# Patient Record
Sex: MALE | Race: WHITE | Employment: PART TIME | URBAN - METROPOLITAN AREA
[De-identification: names, ages, dates, MRNs, and addresses within clinical notes are randomized per-mention and may not be internally consistent; named-entity substitution may affect disease eponyms.]

---

## 2017-04-06 ENCOUNTER — HOSPITAL ENCOUNTER (EMERGENCY)
Age: 42
Discharge: LEFT BEFORE DISCHARGE | End: 2017-04-06
Attending: EMERGENCY MEDICINE | Admitting: EMERGENCY MEDICINE
Payer: SELF-PAY

## 2017-04-06 VITALS
OXYGEN SATURATION: 93 % | BODY MASS INDEX: 24.52 KG/M2 | HEART RATE: 100 BPM | TEMPERATURE: 98.2 F | SYSTOLIC BLOOD PRESSURE: 139 MMHG | DIASTOLIC BLOOD PRESSURE: 83 MMHG | HEIGHT: 73 IN | WEIGHT: 185 LBS | RESPIRATION RATE: 20 BRPM

## 2017-04-06 DIAGNOSIS — S61.211A LACERATION OF LEFT INDEX FINGER: Primary | ICD-10-CM

## 2017-04-06 PROCEDURE — 99281 EMR DPT VST MAYX REQ PHY/QHP: CPT

## 2017-04-06 NOTE — ED NOTES
Patient complains of left first finger laceration while cutting some meat about 3 hours ago. bleeding is controlled at this time, patient walked into triage with his finger in a splint and wrapped in gauze.    Patient refuses xray

## 2017-04-06 NOTE — ED NOTES
Laceration cart has been set up at bedside and provider JAKE Manuel was preparing to go to bedside to assess wound and determine the need for repair. Pt approached nursing station in a hostile manor using vulgar language and reported that he is leaving. Attempted to talk pt into staying by informing him that provider is seeing him next but pt became more agitated and walked away towards ED lobby. Provider was informed.

## 2017-04-06 NOTE — ED PROVIDER NOTES
HPI Comments: Marcos Hardy is a  42 y.o. Normal build  [place] male presents to the ED via POV c/o left index finger lac that occurred while cutting meat 3 hours ago. Pt refuses x-rays. Last Td Unknown       Patient is a 42 y.o. male presenting with skin laceration. Laceration           History reviewed. No pertinent past medical history. History reviewed. No pertinent surgical history. History reviewed. No pertinent family history. Social History     Social History    Marital status: SINGLE     Spouse name: N/A    Number of children: N/A    Years of education: N/A     Occupational History    Not on file. Social History Main Topics    Smoking status: Never Smoker    Smokeless tobacco: Not on file    Alcohol use Yes    Drug use: No    Sexual activity: Not on file     Other Topics Concern    Not on file     Social History Narrative    No narrative on file         ALLERGIES: Review of patient's allergies indicates no known allergies. Review of Systems   Constitutional: Negative for chills and fever. Eyes: Negative. Respiratory: Negative for cough and shortness of breath. Cardiovascular: Negative for chest pain and palpitations. Genitourinary: Negative for urgency. Musculoskeletal: Negative for arthralgias and joint swelling. Skin: Positive for wound. Negative for color change, pallor and rash. Neurological: Negative for dizziness and headaches. Psychiatric/Behavioral: Negative for behavioral problems. Vitals:    04/06/17 0315   BP: 139/83   Pulse: 100   Resp: 20   Temp: 98.2 °F (36.8 °C)   SpO2: 93%   Weight: 83.9 kg (185 lb)   Height: 6' 1\" (1.854 m)            Physical Exam   Constitutional: He is oriented to person, place, and time. He appears well-developed and well-nourished. Cardiovascular: Normal rate, regular rhythm and normal heart sounds. Pulmonary/Chest: Effort normal and breath sounds normal. No respiratory distress.    Neurological: He is alert and oriented to person, place, and time. He has normal strength. He is not disoriented. No sensory deficit. Gait Normal   Skin: Skin is warm and dry. Laceration (1.5 cm lac to the LUE Index Finger) noted. No rash noted. No cyanosis. No pallor. Nursing note and vitals reviewed. MDM  Number of Diagnoses or Management Options  Laceration of left index finger: new and requires workup  Diagnosis management comments: DDX: left Finger Sprain, Strain, Fracture, Dislocation, Laceration (Simple v. Complex), Contusion, Abrasion, Cellulitis, Paronychia, Felon, Subungual Hematoma, Tenosynovitis, Dupuytren's Contracture, Trigger Finger, Herpetic Ryann. Went to repair laceration, informed by staff patient was unwilling to wait any further. He also refused x-ray of the digit.          Procedures

## 2019-12-20 ENCOUNTER — APPOINTMENT (OUTPATIENT)
Dept: GENERAL RADIOLOGY | Age: 44
End: 2019-12-20
Attending: PHYSICIAN ASSISTANT
Payer: SELF-PAY

## 2019-12-20 ENCOUNTER — HOSPITAL ENCOUNTER (EMERGENCY)
Age: 44
Discharge: HOME | End: 2019-12-20
Attending: EMERGENCY MEDICINE
Payer: SELF-PAY

## 2019-12-20 VITALS
DIASTOLIC BLOOD PRESSURE: 86 MMHG | HEART RATE: 99 BPM | TEMPERATURE: 97.9 F | OXYGEN SATURATION: 98 % | RESPIRATION RATE: 18 BRPM | SYSTOLIC BLOOD PRESSURE: 139 MMHG

## 2019-12-20 DIAGNOSIS — S20.211A CONTUSION OF RIB ON RIGHT SIDE, INITIAL ENCOUNTER: Primary | ICD-10-CM

## 2019-12-20 PROCEDURE — 74011250637 HC RX REV CODE- 250/637: Performed by: PHYSICIAN ASSISTANT

## 2019-12-20 PROCEDURE — 71101 X-RAY EXAM UNILAT RIBS/CHEST: CPT

## 2019-12-20 PROCEDURE — 99283 EMERGENCY DEPT VISIT LOW MDM: CPT

## 2019-12-20 RX ORDER — HYDROCODONE BITARTRATE AND ACETAMINOPHEN 7.5; 325 MG/1; MG/1
1 TABLET ORAL
Status: COMPLETED | OUTPATIENT
Start: 2019-12-20 | End: 2019-12-20

## 2019-12-20 RX ORDER — IBUPROFEN 800 MG/1
800 TABLET ORAL
Qty: 20 TAB | Refills: 0 | Status: SHIPPED | OUTPATIENT
Start: 2019-12-20 | End: 2019-12-27

## 2019-12-20 RX ORDER — HYDROCODONE BITARTRATE AND ACETAMINOPHEN 5; 325 MG/1; MG/1
1 TABLET ORAL
Qty: 12 TAB | Refills: 0 | Status: SHIPPED | OUTPATIENT
Start: 2019-12-20 | End: 2019-12-23

## 2019-12-20 RX ORDER — IBUPROFEN 600 MG/1
600 TABLET ORAL
Status: COMPLETED | OUTPATIENT
Start: 2019-12-20 | End: 2019-12-20

## 2019-12-20 RX ADMIN — HYDROCODONE BITARTRATE AND ACETAMINOPHEN 1 TABLET: 7.5; 325 TABLET ORAL at 19:22

## 2019-12-20 RX ADMIN — IBUPROFEN 600 MG: 600 TABLET, FILM COATED ORAL at 19:22

## 2019-12-20 NOTE — LETTER
NOTIFICATION RETURN TO WORK / SCHOOL 
 
12/20/2019 8:30 PM 
 
[facility] [address] [facility] [address] [address] [address] To Whom It May Concern: 
 
Juma Laughlin is currently under the care of [facility]. He will return to work/school on: 12/23/19. If there are questions or concerns please have the patient contact our office. Sincerely, 
 
 
Corinne Parsley, PA 
12/20/19
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
## 2019-12-21 NOTE — DISCHARGE INSTRUCTIONS
Patient Education        Bruised Rib: Care Instructions  Overview    You can get a bruised rib if you fall or get hit, such as in an accident or while playing sports. The medical term for a bruise is \"contusion. \" Small blood vessels get torn and leak blood under the skin. Most people think of a bruise as a black-and-blue area. But bones and muscles can also get bruised. An injury may damage the rib but not cause a bruise that you can see. Sometimes it can be hard to tell if a rib is bruised or broken. The symptoms may be the same. And a broken bone can't always be seen on an X-ray. But the treatment for a bruised rib is often the same as treatment for a broken one. An injury to the ribs can cause pain. The pain may be worse when you breathe deeply, cough, or sneeze. In most cases, a bruised rib will heal on its own. You can take pain medicine while the rib mends. Pain relief allows you to take deep breaths. Follow-up care is a key part of your treatment and safety. Be sure to make and go to all appointments, and call your doctor if you are having problems. It's also a good idea to know your test results and keep a list of the medicines you take. How can you care for yourself at home? · Rest and protect the injured or sore area. Stop, change, or take a break from any activity that causes pain. · Put ice or a cold pack on the area for 10 to 20 minutes at a time. Put a thin cloth between the ice and your skin. · After 2 or 3 days, if your swelling is gone, put a heating pad set on low or a warm cloth on your chest. Some doctors suggest that you go back and forth between hot and cold. Put a thin cloth between the heating pad and your skin. · Ask your doctor if you can take an over-the-counter pain medicine, such as acetaminophen (Tylenol), ibuprofen (Advil, Motrin), or naproxen (Aleve). Be safe with medicines. Read and follow all instructions on the label.   · As your pain gets better, slowly return to your normal activities. Be patient. Rib bruises can take weeks or months to heal. If the pain gets worse, it may be a sign that you need to rest a while longer. When should you call for help? Call 911 anytime you think you may need emergency care. For example, call if:    · You have severe trouble breathing.     Call your doctor now or seek immediate medical care if:    · You have trouble breathing.     · You have a fever.     · You have a new or worse cough.     · You have new or worse pain.    Watch closely for changes in your health, and be sure to contact your doctor if:    · You do not get better as expected. Where can you learn more? Go to http://moncho-oli.info/. Enter R125 in the search box to learn more about \"Bruised Rib: Care Instructions. \"  Current as of: June 26, 2019  Content Version: 12.2  © 1559-4054 [employer]. Care instructions adapted under license by [employer] (which disclaims liability or warranty for this information). If you have questions about a medical condition or this instruction, always ask your healthcare professional. [address] any warranty or liability for your use of this information.

## 2019-12-21 NOTE — ED NOTES
JAKE Hanson has reviewed discharge instructions with the patient. The patient verbalized understanding. Discharge medications reviewed with patient and appropriate educational materials and side effects teaching were provided. Patient was given the opportunity to ask questions and he stated that he did not have any at this time. Work note provided at time of discharge.
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
